# Patient Record
Sex: MALE | Race: WHITE | NOT HISPANIC OR LATINO
[De-identification: names, ages, dates, MRNs, and addresses within clinical notes are randomized per-mention and may not be internally consistent; named-entity substitution may affect disease eponyms.]

---

## 2017-08-28 PROBLEM — Z00.00 ENCOUNTER FOR PREVENTIVE HEALTH EXAMINATION: Status: ACTIVE | Noted: 2017-08-28

## 2017-10-25 ENCOUNTER — APPOINTMENT (OUTPATIENT)
Dept: UROLOGY | Facility: CLINIC | Age: 42
End: 2017-10-25

## 2020-01-27 ENCOUNTER — INPATIENT (INPATIENT)
Facility: HOSPITAL | Age: 45
LOS: 0 days | Discharge: HOME | End: 2020-01-28
Attending: INTERNAL MEDICINE | Admitting: INTERNAL MEDICINE
Payer: COMMERCIAL

## 2020-01-27 VITALS
OXYGEN SATURATION: 99 % | HEART RATE: 125 BPM | HEIGHT: 70 IN | TEMPERATURE: 98 F | RESPIRATION RATE: 20 BRPM | SYSTOLIC BLOOD PRESSURE: 177 MMHG | WEIGHT: 220.02 LBS | DIASTOLIC BLOOD PRESSURE: 97 MMHG

## 2020-01-27 DIAGNOSIS — Z90.89 ACQUIRED ABSENCE OF OTHER ORGANS: Chronic | ICD-10-CM

## 2020-01-27 LAB
ALBUMIN SERPL ELPH-MCNC: 4.8 G/DL — SIGNIFICANT CHANGE UP (ref 3.5–5.2)
ALP SERPL-CCNC: 49 U/L — SIGNIFICANT CHANGE UP (ref 30–115)
ALT FLD-CCNC: 40 U/L — SIGNIFICANT CHANGE UP (ref 0–41)
ANION GAP SERPL CALC-SCNC: 11 MMOL/L — SIGNIFICANT CHANGE UP (ref 7–14)
AST SERPL-CCNC: 31 U/L — SIGNIFICANT CHANGE UP (ref 0–41)
BASOPHILS # BLD AUTO: 0.03 K/UL — SIGNIFICANT CHANGE UP (ref 0–0.2)
BASOPHILS NFR BLD AUTO: 0.4 % — SIGNIFICANT CHANGE UP (ref 0–1)
BILIRUB SERPL-MCNC: 0.6 MG/DL — SIGNIFICANT CHANGE UP (ref 0.2–1.2)
BUN SERPL-MCNC: 13 MG/DL — SIGNIFICANT CHANGE UP (ref 10–20)
CALCIUM SERPL-MCNC: 9.7 MG/DL — SIGNIFICANT CHANGE UP (ref 8.5–10.1)
CHLORIDE SERPL-SCNC: 100 MMOL/L — SIGNIFICANT CHANGE UP (ref 98–110)
CK MB CFR SERPL CALC: 2.2 NG/ML — SIGNIFICANT CHANGE UP (ref 0.6–6.3)
CK SERPL-CCNC: 249 U/L — HIGH (ref 0–225)
CO2 SERPL-SCNC: 26 MMOL/L — SIGNIFICANT CHANGE UP (ref 17–32)
CREAT SERPL-MCNC: 1.3 MG/DL — SIGNIFICANT CHANGE UP (ref 0.7–1.5)
D DIMER BLD IA.RAPID-MCNC: 62 NG/ML DDU — SIGNIFICANT CHANGE UP (ref 0–230)
EOSINOPHIL # BLD AUTO: 0.11 K/UL — SIGNIFICANT CHANGE UP (ref 0–0.7)
EOSINOPHIL NFR BLD AUTO: 1.6 % — SIGNIFICANT CHANGE UP (ref 0–8)
GLUCOSE SERPL-MCNC: 94 MG/DL — SIGNIFICANT CHANGE UP (ref 70–99)
HCT VFR BLD CALC: 47.8 % — SIGNIFICANT CHANGE UP (ref 42–52)
HGB BLD-MCNC: 16.7 G/DL — SIGNIFICANT CHANGE UP (ref 14–18)
IMM GRANULOCYTES NFR BLD AUTO: 0.7 % — HIGH (ref 0.1–0.3)
LYMPHOCYTES # BLD AUTO: 2.15 K/UL — SIGNIFICANT CHANGE UP (ref 1.2–3.4)
LYMPHOCYTES # BLD AUTO: 30.5 % — SIGNIFICANT CHANGE UP (ref 20.5–51.1)
MCHC RBC-ENTMCNC: 29 PG — SIGNIFICANT CHANGE UP (ref 27–31)
MCHC RBC-ENTMCNC: 34.9 G/DL — SIGNIFICANT CHANGE UP (ref 32–37)
MCV RBC AUTO: 83 FL — SIGNIFICANT CHANGE UP (ref 80–94)
MONOCYTES # BLD AUTO: 0.57 K/UL — SIGNIFICANT CHANGE UP (ref 0.1–0.6)
MONOCYTES NFR BLD AUTO: 8.1 % — SIGNIFICANT CHANGE UP (ref 1.7–9.3)
NEUTROPHILS # BLD AUTO: 4.14 K/UL — SIGNIFICANT CHANGE UP (ref 1.4–6.5)
NEUTROPHILS NFR BLD AUTO: 58.7 % — SIGNIFICANT CHANGE UP (ref 42.2–75.2)
NRBC # BLD: 0 /100 WBCS — SIGNIFICANT CHANGE UP (ref 0–0)
PLATELET # BLD AUTO: 224 K/UL — SIGNIFICANT CHANGE UP (ref 130–400)
POTASSIUM SERPL-MCNC: 4.7 MMOL/L — SIGNIFICANT CHANGE UP (ref 3.5–5)
POTASSIUM SERPL-SCNC: 4.7 MMOL/L — SIGNIFICANT CHANGE UP (ref 3.5–5)
PROT SERPL-MCNC: 7.5 G/DL — SIGNIFICANT CHANGE UP (ref 6–8)
RBC # BLD: 5.76 M/UL — SIGNIFICANT CHANGE UP (ref 4.7–6.1)
RBC # FLD: 12.8 % — SIGNIFICANT CHANGE UP (ref 11.5–14.5)
SODIUM SERPL-SCNC: 137 MMOL/L — SIGNIFICANT CHANGE UP (ref 135–146)
TROPONIN T SERPL-MCNC: <0.01 NG/ML — SIGNIFICANT CHANGE UP
TROPONIN T SERPL-MCNC: <0.01 NG/ML — SIGNIFICANT CHANGE UP
WBC # BLD: 7.05 K/UL — SIGNIFICANT CHANGE UP (ref 4.8–10.8)
WBC # FLD AUTO: 7.05 K/UL — SIGNIFICANT CHANGE UP (ref 4.8–10.8)

## 2020-01-27 PROCEDURE — 99223 1ST HOSP IP/OBS HIGH 75: CPT

## 2020-01-27 PROCEDURE — 99291 CRITICAL CARE FIRST HOUR: CPT

## 2020-01-27 PROCEDURE — 71045 X-RAY EXAM CHEST 1 VIEW: CPT | Mod: 26

## 2020-01-27 RX ORDER — ENOXAPARIN SODIUM 100 MG/ML
100 INJECTION SUBCUTANEOUS ONCE
Refills: 0 | Status: COMPLETED | OUTPATIENT
Start: 2020-01-27 | End: 2020-01-27

## 2020-01-27 RX ORDER — DILTIAZEM HCL 120 MG
30 CAPSULE, EXT RELEASE 24 HR ORAL EVERY 6 HOURS
Refills: 0 | Status: DISCONTINUED | OUTPATIENT
Start: 2020-01-27 | End: 2020-01-28

## 2020-01-27 RX ORDER — ZOLPIDEM TARTRATE 10 MG/1
5 TABLET ORAL AT BEDTIME
Refills: 0 | Status: DISCONTINUED | OUTPATIENT
Start: 2020-01-27 | End: 2020-01-28

## 2020-01-27 RX ORDER — SODIUM CHLORIDE 9 MG/ML
1000 INJECTION INTRAMUSCULAR; INTRAVENOUS; SUBCUTANEOUS ONCE
Refills: 0 | Status: COMPLETED | OUTPATIENT
Start: 2020-01-27 | End: 2020-01-27

## 2020-01-27 RX ORDER — DILTIAZEM HCL 120 MG
10 CAPSULE, EXT RELEASE 24 HR ORAL ONCE
Refills: 0 | Status: COMPLETED | OUTPATIENT
Start: 2020-01-27 | End: 2020-01-27

## 2020-01-27 RX ORDER — ENOXAPARIN SODIUM 100 MG/ML
100 INJECTION SUBCUTANEOUS EVERY 12 HOURS
Refills: 0 | Status: DISCONTINUED | OUTPATIENT
Start: 2020-01-27 | End: 2020-01-28

## 2020-01-27 RX ORDER — LOSARTAN POTASSIUM 100 MG/1
0 TABLET, FILM COATED ORAL
Qty: 0 | Refills: 0 | DISCHARGE

## 2020-01-27 RX ORDER — DILTIAZEM HCL 120 MG
5 CAPSULE, EXT RELEASE 24 HR ORAL
Qty: 125 | Refills: 0 | Status: DISCONTINUED | OUTPATIENT
Start: 2020-01-27 | End: 2020-01-27

## 2020-01-27 RX ORDER — CHLORHEXIDINE GLUCONATE 213 G/1000ML
1 SOLUTION TOPICAL
Refills: 0 | Status: DISCONTINUED | OUTPATIENT
Start: 2020-01-27 | End: 2020-01-28

## 2020-01-27 RX ORDER — INFLUENZA VIRUS VACCINE 15; 15; 15; 15 UG/.5ML; UG/.5ML; UG/.5ML; UG/.5ML
0.5 SUSPENSION INTRAMUSCULAR ONCE
Refills: 0 | Status: DISCONTINUED | OUTPATIENT
Start: 2020-01-27 | End: 2020-01-28

## 2020-01-27 RX ADMIN — Medication 5 MG/HR: at 19:00

## 2020-01-27 RX ADMIN — ENOXAPARIN SODIUM 100 MILLIGRAM(S): 100 INJECTION SUBCUTANEOUS at 14:56

## 2020-01-27 RX ADMIN — Medication 10 MILLIGRAM(S): at 13:08

## 2020-01-27 RX ADMIN — SODIUM CHLORIDE 2000 MILLILITER(S): 9 INJECTION INTRAMUSCULAR; INTRAVENOUS; SUBCUTANEOUS at 13:09

## 2020-01-27 RX ADMIN — Medication 5 MG/HR: at 13:09

## 2020-01-27 RX ADMIN — Medication 30 MILLIGRAM(S): at 23:57

## 2020-01-27 RX ADMIN — ZOLPIDEM TARTRATE 5 MILLIGRAM(S): 10 TABLET ORAL at 23:57

## 2020-01-27 NOTE — H&P ADULT - HISTORY OF PRESENT ILLNESS
44 yr M with hx of HTN not taking medications regularly, hypogonadism ?treated with testosterone every 2 weekly came with c/o acute onset SOB and lethargy today at work and found to be in atrial fibrillation .  pt has a hx of HTN but don't takes meds regularly ,takes Losartan 50 PRN ,was having headaches and his bp was high for last 2-3 days which is  unusual for him .Took 100 mg Losartan today ,no c/o chest pain ,cough or any other symptoms .Was planning to get evaluated for possible sleep apnea .  Has a negative stress test with Dr Parra a yr ago.    In ER ,EKG a fib , negative CE   received Cardizem drip and Lovenox for A/c   admitted to CCU for rate control and work up and cardio eval

## 2020-01-27 NOTE — ED PROVIDER NOTE - CLINICAL SUMMARY MEDICAL DECISION MAKING FREE TEXT BOX
Patient will be admitted to a monitored setting. In my opinion, in patient treatment is medically justifiable and appropriate.

## 2020-01-27 NOTE — ED PROVIDER NOTE - OBJECTIVE STATEMENT
c/p mild substernal chest pain w/o radiation x 1 day a/w HTN and anxiety.  no palpitations.  denies fever, trauma.    PMH: HTN  previous negative stress tests

## 2020-01-27 NOTE — ED PROVIDER NOTE - PHYSICAL EXAMINATION
VITAL SIGNS: I have reviewed nursing notes and confirm.  CONSTITUTIONAL: Well-developed; well-nourished; in no acute distress.  SKIN: Skin exam is warm and dry, no acute rash.  HEAD: Normocephalic; atraumatic.  EYES: PERRL, EOM intact; conjunctiva and sclera clear.  ENT: No nasal discharge; airway clear. Throat clear.  NECK: Supple; non tender.  No lymphadenopathy.  no JVD  CARD: no murmurs, gallops, or rubs. Irregular rate and rhythm.  RESP: No wheezes, rales or rhonchi.  ABD: Normal bowel sounds; soft; non-distended; non-tender; no hepatosplenomegaly.  EXT: Normal ROM. No clubbing, cyanosis or edema.  NEURO: Alert, oriented. Grossly unremarkable. No focal deficits.  PSYCH: Cooperative, appropriate.

## 2020-01-27 NOTE — H&P ADULT - NSHPOUTPATIENTPROVIDERS_GEN_ALL_CORE
cardio ; Dr Penn
Number Of Photographs Reviewed: 11
Detail Level: Detailed
Review Findings: no new or changing lesions

## 2020-01-27 NOTE — H&P ADULT - NSHPPHYSICALEXAM_GEN_ALL_CORE
Vital Signs Last 24 Hrs  T(C): 36.9 (27 Jan 2020 15:52), Max: 36.9 (27 Jan 2020 15:52)  T(F): 98.4 (27 Jan 2020 15:52), Max: 98.4 (27 Jan 2020 15:52)  HR: 94 (27 Jan 2020 15:52) (92 - 125)  BP: 151/98 (27 Jan 2020 15:52) (130/99 - 177/97)  BP(mean): --  RR: 18 (27 Jan 2020 15:52) (18 - 20)  SpO2: 99% (27 Jan 2020 15:52) (99% - 99%)  PHYSICAL EXAM:      Constitutional: no acute distress    Eyes: no pallor or icterus    ENMT: within normal range    Neck: no JVD    Back:  no CVA tenderness    Respiratory: VB +0    Cardiovascular: S1 +S2+0    Gastrointestinal: soft ,non tender ,BS +     Extremities:  no LE edema     Vascular: + palpable pulses    Neurological: AAO * 3   non focal

## 2020-01-27 NOTE — ED PROVIDER NOTE - NS ED ROS FT
Constitutional: No fever, chills, malaise.  Eyes: No visual changes, eye pain or discharge. No Photophobia  ENMT: No hearing changes, pain, discharge or infections. No neck pain or stiffness.   GI: No nausea, vomiting, diarrhea or abdominal pain.  : No dysuria, frequency or burning. No Discharge  MS: No myalgia, muscle weakness, joint pain or back pain.  Neuro: No headache or weakness. No LOC.  Skin: No skin rash.  Except as documented in the HPI, all other systems are negative.

## 2020-01-27 NOTE — H&P ADULT - NSHPLABSRESULTS_GEN_ALL_CORE
16.7   7.05  )-----------( 224      ( 27 Jan 2020 13:09 )             47.8   01-27    137  |  100  |  13  ----------------------------<  94  4.7   |  26  |  1.3    Ca    9.7      27 Jan 2020 13:09    TPro  7.5  /  Alb  4.8  /  TBili  0.6  /  DBili  x   /  AST  31  /  ALT  40  /  AlkPhos  49  01-27  CARDIAC MARKERS ( 27 Jan 2020 13:09 )  x     / <0.01 ng/mL / x     / x     / x        < from: 12 Lead ECG (01.27.20 @ 12:29) >    Diagnosis Line Atrial fibrillation with rapid ventricular response  Incompleteright bundle branch block  Abnormal ECG    < end of copied text >    < from: Xray Chest 1 View-PORTABLE IMMEDIATE (01.27.20 @ 13:04) >      Impression:      No radiographic evidence of acute cardiopulmonary disease.    < end of copied text >

## 2020-01-27 NOTE — H&P ADULT - ASSESSMENT
New onset A fib   HTN   possible sleep apnea   Hypogonadism  s/p  testosterone therapy ?    # NEW ONSET A FIB ;  cw cardizem drip   CHADvasc score 1   will anti coagulate with Lovenox for now   2 D echo   serial cardiac enzymes   cardio eval   TSH ,free T4   lipid profile and Hg A1c for risk stratification   out pt sleep studies    # HTN ;  cw Cardizem for now     # DVT ppx ; with lovenox  DASH diet   FULL code     # dispo; CCU

## 2020-01-28 ENCOUNTER — TRANSCRIPTION ENCOUNTER (OUTPATIENT)
Age: 45
End: 2020-01-28

## 2020-01-28 VITALS
OXYGEN SATURATION: 97 % | RESPIRATION RATE: 18 BRPM | HEART RATE: 81 BPM | SYSTOLIC BLOOD PRESSURE: 142 MMHG | TEMPERATURE: 98 F | DIASTOLIC BLOOD PRESSURE: 86 MMHG

## 2020-01-28 LAB
ALBUMIN SERPL ELPH-MCNC: 4.7 G/DL — SIGNIFICANT CHANGE UP (ref 3.5–5.2)
ALP SERPL-CCNC: 49 U/L — SIGNIFICANT CHANGE UP (ref 30–115)
ALT FLD-CCNC: 37 U/L — SIGNIFICANT CHANGE UP (ref 0–41)
ANION GAP SERPL CALC-SCNC: 12 MMOL/L — SIGNIFICANT CHANGE UP (ref 7–14)
AST SERPL-CCNC: 25 U/L — SIGNIFICANT CHANGE UP (ref 0–41)
BASOPHILS # BLD AUTO: 0.04 K/UL — SIGNIFICANT CHANGE UP (ref 0–0.2)
BASOPHILS NFR BLD AUTO: 0.4 % — SIGNIFICANT CHANGE UP (ref 0–1)
BILIRUB SERPL-MCNC: 0.8 MG/DL — SIGNIFICANT CHANGE UP (ref 0.2–1.2)
BUN SERPL-MCNC: 14 MG/DL — SIGNIFICANT CHANGE UP (ref 10–20)
CALCIUM SERPL-MCNC: 9.3 MG/DL — SIGNIFICANT CHANGE UP (ref 8.5–10.1)
CHLORIDE SERPL-SCNC: 104 MMOL/L — SIGNIFICANT CHANGE UP (ref 98–110)
CHOLEST SERPL-MCNC: 237 MG/DL — HIGH (ref 100–200)
CK SERPL-CCNC: 206 U/L — SIGNIFICANT CHANGE UP (ref 0–225)
CO2 SERPL-SCNC: 25 MMOL/L — SIGNIFICANT CHANGE UP (ref 17–32)
CREAT SERPL-MCNC: 1.3 MG/DL — SIGNIFICANT CHANGE UP (ref 0.7–1.5)
EOSINOPHIL # BLD AUTO: 0.12 K/UL — SIGNIFICANT CHANGE UP (ref 0–0.7)
EOSINOPHIL NFR BLD AUTO: 1.3 % — SIGNIFICANT CHANGE UP (ref 0–8)
ESTIMATED AVERAGE GLUCOSE: 97 MG/DL — SIGNIFICANT CHANGE UP (ref 68–114)
GLUCOSE SERPL-MCNC: 86 MG/DL — SIGNIFICANT CHANGE UP (ref 70–99)
HBA1C BLD-MCNC: 5 % — SIGNIFICANT CHANGE UP (ref 4–5.6)
HCT VFR BLD CALC: 49.4 % — SIGNIFICANT CHANGE UP (ref 42–52)
HDLC SERPL-MCNC: 29 MG/DL — LOW
HGB BLD-MCNC: 16.7 G/DL — SIGNIFICANT CHANGE UP (ref 14–18)
IMM GRANULOCYTES NFR BLD AUTO: 0.7 % — HIGH (ref 0.1–0.3)
LIPID PNL WITH DIRECT LDL SERPL: 161 MG/DL — HIGH (ref 4–129)
LYMPHOCYTES # BLD AUTO: 3.12 K/UL — SIGNIFICANT CHANGE UP (ref 1.2–3.4)
LYMPHOCYTES # BLD AUTO: 34.5 % — SIGNIFICANT CHANGE UP (ref 20.5–51.1)
MCHC RBC-ENTMCNC: 28.7 PG — SIGNIFICANT CHANGE UP (ref 27–31)
MCHC RBC-ENTMCNC: 33.8 G/DL — SIGNIFICANT CHANGE UP (ref 32–37)
MCV RBC AUTO: 84.9 FL — SIGNIFICANT CHANGE UP (ref 80–94)
MONOCYTES # BLD AUTO: 0.66 K/UL — HIGH (ref 0.1–0.6)
MONOCYTES NFR BLD AUTO: 7.3 % — SIGNIFICANT CHANGE UP (ref 1.7–9.3)
NEUTROPHILS # BLD AUTO: 5.04 K/UL — SIGNIFICANT CHANGE UP (ref 1.4–6.5)
NEUTROPHILS NFR BLD AUTO: 55.8 % — SIGNIFICANT CHANGE UP (ref 42.2–75.2)
NRBC # BLD: 0 /100 WBCS — SIGNIFICANT CHANGE UP (ref 0–0)
PLATELET # BLD AUTO: 222 K/UL — SIGNIFICANT CHANGE UP (ref 130–400)
POTASSIUM SERPL-MCNC: 4.6 MMOL/L — SIGNIFICANT CHANGE UP (ref 3.5–5)
POTASSIUM SERPL-SCNC: 4.6 MMOL/L — SIGNIFICANT CHANGE UP (ref 3.5–5)
PROT SERPL-MCNC: 7.2 G/DL — SIGNIFICANT CHANGE UP (ref 6–8)
RBC # BLD: 5.82 M/UL — SIGNIFICANT CHANGE UP (ref 4.7–6.1)
RBC # FLD: 13 % — SIGNIFICANT CHANGE UP (ref 11.5–14.5)
SODIUM SERPL-SCNC: 141 MMOL/L — SIGNIFICANT CHANGE UP (ref 135–146)
T4 FREE SERPL-MCNC: 1.2 NG/DL — SIGNIFICANT CHANGE UP (ref 0.9–1.8)
TOTAL CHOLESTEROL/HDL RATIO MEASUREMENT: 8.2 RATIO — HIGH (ref 4–5.5)
TRIGL SERPL-MCNC: 302 MG/DL — HIGH (ref 10–149)
TROPONIN T SERPL-MCNC: <0.01 NG/ML — SIGNIFICANT CHANGE UP
TSH SERPL-MCNC: 2.16 UIU/ML — SIGNIFICANT CHANGE UP (ref 0.27–4.2)
WBC # BLD: 9.04 K/UL — SIGNIFICANT CHANGE UP (ref 4.8–10.8)
WBC # FLD AUTO: 9.04 K/UL — SIGNIFICANT CHANGE UP (ref 4.8–10.8)

## 2020-01-28 PROCEDURE — 71045 X-RAY EXAM CHEST 1 VIEW: CPT | Mod: 26

## 2020-01-28 PROCEDURE — 99239 HOSP IP/OBS DSCHRG MGMT >30: CPT

## 2020-01-28 RX ORDER — LOSARTAN POTASSIUM 100 MG/1
1 TABLET, FILM COATED ORAL
Qty: 0 | Refills: 0 | DISCHARGE

## 2020-01-28 RX ORDER — ATORVASTATIN CALCIUM 80 MG/1
40 TABLET, FILM COATED ORAL AT BEDTIME
Refills: 0 | Status: DISCONTINUED | OUTPATIENT
Start: 2020-01-28 | End: 2020-01-28

## 2020-01-28 RX ORDER — METOPROLOL TARTRATE 50 MG
1 TABLET ORAL
Qty: 60 | Refills: 0
Start: 2020-01-28 | End: 2020-02-26

## 2020-01-28 RX ORDER — ATORVASTATIN CALCIUM 80 MG/1
1 TABLET, FILM COATED ORAL
Qty: 30 | Refills: 0
Start: 2020-01-28 | End: 2020-02-26

## 2020-01-28 RX ORDER — RIVAROXABAN 15 MG-20MG
20 KIT ORAL
Refills: 0 | Status: DISCONTINUED | OUTPATIENT
Start: 2020-01-28 | End: 2020-01-28

## 2020-01-28 RX ORDER — METOPROLOL TARTRATE 50 MG
25 TABLET ORAL
Refills: 0 | Status: DISCONTINUED | OUTPATIENT
Start: 2020-01-28 | End: 2020-01-28

## 2020-01-28 RX ORDER — RIVAROXABAN 15 MG-20MG
1 KIT ORAL
Qty: 30 | Refills: 0
Start: 2020-01-28 | End: 2020-02-26

## 2020-01-28 RX ADMIN — Medication 30 MILLIGRAM(S): at 06:34

## 2020-01-28 RX ADMIN — ENOXAPARIN SODIUM 100 MILLIGRAM(S): 100 INJECTION SUBCUTANEOUS at 03:00

## 2020-01-28 RX ADMIN — RIVAROXABAN 20 MILLIGRAM(S): KIT at 15:15

## 2020-01-28 NOTE — DISCHARGE NOTE PROVIDER - HOSPITAL COURSE
44 yr M with hx of HTN not taking medications regularly, hypogonadism ?treated with testosterone every 2 weekly came with c/o acute onset SOB and lethargy today at work and found to be in atrial fibrillation .    pt has a hx of HTN but don't takes meds regularly ,takes Losartan 50 PRN ,was having headaches and his bp was high for last 2-3 days which is  unusual for him .Took 100 mg Losartan today ,no c/o chest pain ,cough or any other symptoms .Was planning to get evaluated for possible sleep apnea .    Has a negative stress test with Dr Parra a yr ago.        In ER ,EKG a fib , negative CE     received Cardizem drip and Lovenox for A/c     admitted to CCU for rate control and work up and cardio eval (27 Jan 2020 16:24)        # For NEW ONSET A FIB ;pt was given cardizem drip and than switched to on oral metoprolol 50 bid     will d/c pt on xaralto 20 qd    TSH ,free T4 within normal range    lipid profile abn ,started on Lipitor 40    out pt sleep studies with Dr Julian and out pt follow up with Dr Penn for A fib     pt converted to sinus today

## 2020-01-28 NOTE — DISCHARGE NOTE PROVIDER - NSDCMRMEDTOKEN_GEN_ALL_CORE_FT
atorvastatin 40 mg oral tablet: 1 tab(s) orally once a day (at bedtime)  metoprolol tartrate 25 mg oral tablet: 1 tab(s) orally 2 times a day  rivaroxaban 20 mg oral tablet: 1 tab(s) orally once a day (before a meal)

## 2020-01-28 NOTE — DISCHARGE NOTE PROVIDER - CARE PROVIDERS DIRECT ADDRESSES
,DirectAddress_Unknown,[email or web address],DirectAddress_Unknown

## 2020-01-28 NOTE — PROGRESS NOTE ADULT - ASSESSMENT
Patient with afib. Not clear how long. He has untreated HTN. He has probable joon.  Beta Xarelto 20 echo. Outpatient sleep study . If remain in afib after use c-pap cardiovert as outpatient

## 2020-01-28 NOTE — DISCHARGE NOTE PROVIDER - NSDCCPCAREPLAN_GEN_ALL_CORE_FT
PRINCIPAL DISCHARGE DIAGNOSIS  Diagnosis: Atrial fibrillation with rapid ventricular response  Assessment and Plan of Treatment:       SECONDARY DISCHARGE DIAGNOSES  Diagnosis: FANG (obstructive sleep apnea)  Assessment and Plan of Treatment: suspected

## 2020-01-28 NOTE — DISCHARGE NOTE NURSING/CASE MANAGEMENT/SOCIAL WORK - PATIENT PORTAL LINK FT
You can access the FollowMyHealth Patient Portal offered by [facility] by registering at the following website: [email or web address]. By joining [employer]’s FollowMyHealth portal, you will also be able to view your health information using other applications (apps) compatible with our system.

## 2020-01-28 NOTE — DISCHARGE NOTE PROVIDER - CARE PROVIDER_API CALL
Min Penn)  Cardiovascular Disease; Interventional Cardiology  [address]  [place] [address]  Phone: [phone]  Fax: [phone]  Follow Up Time:     Femi Julian)  Critical Care Medicine; Geriatric Medicine; Internal Medicine; Pulmonary Disease  [address] [place] [address], [address]  [place] [address]  Phone: [phone]  Fax: [phone]  Follow Up Time:     Piotr Natarajan)  Med  [facility]ist[facility]  [address] [place] [address]  Phone: [phone]  Fax: [phone]  Follow Up Time:

## 2020-01-28 NOTE — DISCHARGE NOTE PROVIDER - PROVIDER TOKENS
PROVIDER:[TOKEN:[[id number]]],PROVIDER:[TOKEN:[[id number]]],PROVIDER:[TOKEN:[[id number]]]

## 2020-01-28 NOTE — PROGRESS NOTE ADULT - SUBJECTIVE AND OBJECTIVE BOX
CARDIOLOGY CONSULT NOTE     CHIEF COMPLAINT/REASON FOR CONSULT:    HPI:  44 yr M with hx of HTN not taking medications regularly, hypogonadism ?treated with testosterone every 2 weekly came with c/o acute onset SOB and lethargy today at work and found to be in atrial fibrillation .  pt has a hx of HTN but doed not  take meds regularly ,takes Losartan 50 PRN ,was having headaches and his bp was high for last 2-3 days which is  unusual for him .Took 100 mg Losartan yesterday ,no c/o chest pain ,cough or any other symptoms .Was planning to get evaluated for possible sleep apnea .  Has a negative stress test with Dr Parra a yr ago.    In ER ,EKG a fib , negative CE       PAST MEDICAL & SURGICAL HISTORY:  HTN (hypertension)  S/P tonsillectomy      Cardiac Risks:   [x ]HTN, [ ] DM, [ ] Smoking, [ ] FH,  [ ] Lipids        MEDICATIONS:  MEDICATIONS  (STANDING):  atorvastatin 40 milliGRAM(s) Oral at bedtime  chlorhexidine 4% Liquid 1 Application(s) Topical <User Schedule>  enoxaparin Injectable 100 milliGRAM(s) SubCutaneous every 12 hours  influenza   Vaccine 0.5 milliLiter(s) IntraMuscular once  metoprolol tartrate 25 milliGRAM(s) Oral two times a day      FAMILY HISTORY:  FH: atrial fibrillation: father      SOCIAL HISTORY:      [ ] Marital status   Allergies    No Known Allergies      	    REVIEW OF SYSTEMS:  CONSTITUTIONAL: No fever, weight loss, or fatigue  EYES: No eye pain, visual disturbances, or discharge  ENMT:  No difficulty hearing, tinnitus, vertigo; No sinus or throat pain  NECK: No pain or stiffness  RESPIRATORY: No cough, wheezing, chills or hemoptysis; No Shortness of Breath  CARDIOVASCULAR: See above  GASTROINTESTINAL: No abdominal or epigastric pain. No nausea, vomiting, or hematemesis; No diarrhea or constipation. No melena or hematochezia.  GENITOURINARY: No dysuria, frequency, hematuria, or incontinence  NEUROLOGICAL: No headaches, memory loss, loss of strength, numbness, or tremors  SKIN: No itching, burning, rashes, or lesions   	    PHYSICAL EXAM:  T(C): 36 (01-28-20 @ 07:13), Max: 36.9 (01-27-20 @ 15:52)  HR: 66 (01-28-20 @ 07:13) (66 - 125)  BP: 116/72 (01-28-20 @ 07:13) (116/72 - 177/97)  RR: 20 (01-28-20 @ 07:13) (18 - 20)  SpO2: 97% (01-28-20 @ 07:13) (97% - 99%)  Wt(kg): --  I&O's Summary    27 Jan 2020 07:01  -  28 Jan 2020 07:00  --------------------------------------------------------  IN: 40 mL / OUT: 1350 mL / NET: -1310 mL        Appearance: Normal	  Psychiatry: A & O x 3, Mood & affect appropriate  HEENT:   Normal oral mucosa, PERRL, EOMI	  Lymphatic: No lymphadenopathy  Cardiovascular: Normal S1 S2,irreg, No JVD, No murmurs  Respiratory: Lungs clear to auscultation	  Gastrointestinal:  Soft, Non-tender, + BS	  Skin: No rashes, No ecchymoses, No cyanosis	  Neurologic: Non-focal  Extremities: Normal range of motion, No clubbing, cyanosis or edema  Vascular: Peripheral pulses palpable 2+ bilaterally      ECG:  	< from: 12 Lead ECG (01.27.20 @ 12:29) >    Diagnosis Line Atrial fibrillation with rapid ventricular response  Incompleteright bundle branch block  Abnormal ECG    Confirmed by CHRYSTAL BASURTO MD ([id number]) on 1/27/2020 1:41:43 PM    < end of copied text >      	  LABS:	 	    CARDIAC MARKERS:                                    16.7   9.04  )-----------( 222      ( 28 Jan 2020 05:55 )             49.4     01-28    141  |  104  |  14  ----------------------------<  86  4.6   |  25  |  1.3    Ca    9.3      28 Jan 2020 05:55    TPro  7.2  /  Alb  4.7  /  TBili  0.8  /  DBili  <0.2  /  AST  25  /  ALT  37  /  AlkPhos  49  01-28

## 2020-01-28 NOTE — DISCHARGE NOTE PROVIDER - NSDCCPTREATMENT_GEN_ALL_CORE_FT
PRINCIPAL PROCEDURE  Procedure: 2D echo  Findings and Treatment: normal EF with mild TR,no atrial thrombus

## 2020-01-28 NOTE — PROGRESS NOTE ADULT - ATTENDING COMMENTS
Patient seen and evaluated independently medical resident note reviewed, I agree with plan and management, except as I have noted. echo is normal may dc home today on Xarelto, Lopressor, Lipitor. 33min spent on dc. needs outpt sleep study, pt aware

## 2020-01-28 NOTE — PROGRESS NOTE ADULT - ASSESSMENT
Assessment and Plan:   Assessment:  · Assessment		  New onset A fib   HTN   possible sleep apnea   Hypogonadism  s/p  testosterone therapy ?    # NEW ONSET A FIB ;  d/c Cardizem drip  started on oral metoprolol 50 bid   CHADvasc score 1   cw l anti coagulate with Lovenox for now   follow 2 D echo (if no valvular disease than will d/c pt on Xarelto )  serial cardiac enzymes negative so far  cardio eval pending  TSH ,free T4 within normal range  lipid profile abn ,started on Lipitor 40  out pt sleep studies    # HTN ;  cw Lopressor     # DVT ppx ; with lovenox  DASH diet   FULL code     # dispo; d/c planning   pending echo read and cardio eval   will need [facility] appointment and pricing to be checked if sending pt with Xarelto

## 2020-01-28 NOTE — PROGRESS NOTE ADULT - SUBJECTIVE AND OBJECTIVE BOX
LENGTH OF HOSPITAL STAY: 1d    CHIEF COMPLAINT:   Patient is a 44y old  Male who presents with a chief complaint of sob and lethargy /new onset a fib (27 Jan 2020 16:24)    EVENTS OVERNIGHT ; was bradycardic overnight   Cardizem drip d/brian   asymptomatic   pending cardio eval and echo results       HISTORY OF PRESENTING ILLNESS:    HPI:  44 yr M with hx of HTN not taking medications regularly, hypogonadism ?treated with testosterone every 2 weekly came with c/o acute onset SOB and lethargy today at work and found to be in atrial fibrillation .  pt has a hx of HTN but don't takes meds regularly ,takes Losartan 50 PRN ,was having headaches and his bp was high for last 2-3 days which is  unusual for him .Took 100 mg Losartan today ,no c/o chest pain ,cough or any other symptoms .Was planning to get evaluated for possible sleep apnea .  Has a negative stress test with Dr Parra a yr ago.    In ER ,EKG a fib , negative CE   received Cardizem drip and Lovenox for A/c   admitted to CCU for rate control and work up and cardio eval (27 Jan 2020 16:24)    PAST MEDICAL & SURGICAL HISTORY  PAST MEDICAL & SURGICAL HISTORY:  HTN (hypertension)  S/P tonsillectomy    SOCIAL HISTORY:    ALLERGIES:  No Known Allergies    MEDICATIONS:  STANDING MEDICATIONS  atorvastatin 40 milliGRAM(s) Oral at bedtime  chlorhexidine 4% Liquid 1 Application(s) Topical <User Schedule>  enoxaparin Injectable 100 milliGRAM(s) SubCutaneous every 12 hours  influenza   Vaccine 0.5 milliLiter(s) IntraMuscular once  metoprolol tartrate 25 milliGRAM(s) Oral two times a day    PRN MEDICATIONS  zolpidem 5 milliGRAM(s) Oral at bedtime PRN    VITALS:   T(F): 96.8  HR: 66  BP: 116/72  RR: 20  SpO2: 97%    LABS:                        16.7   9.04  )-----------( 222      ( 28 Jan 2020 05:55 )             49.4     01-28    141  |  104  |  14  ----------------------------<  86  4.6   |  25  |  1.3    Ca    9.3      28 Jan 2020 05:55    TPro  7.2  /  Alb  4.7  /  TBili  0.8  /  DBili  <0.2  /  AST  25  /  ALT  37  /  AlkPhos  49  01-28          Creatine Kinase, Serum: 206 U/L (01-28-20 @ 05:55)  Troponin T, Serum: <0.01 ng/mL (01-28-20 @ 05:55)  Creatine Kinase, Serum: 249 U/L <H> (01-27-20 @ 19:46)  Troponin T, Serum: <0.01 ng/mL (01-27-20 @ 19:46)  Troponin T, Serum: <0.01 ng/mL (01-27-20 @ 13:09)      CARDIAC MARKERS ( 28 Jan 2020 05:55 )  x     / <0.01 ng/mL / 206 U/L / x     / x      CARDIAC MARKERS ( 27 Jan 2020 19:46 )  x     / <0.01 ng/mL / 249 U/L / x     / 2.2 ng/mL  CARDIAC MARKERS ( 27 Jan 2020 13:09 )  x     / <0.01 ng/mL / x     / x     / x          RADIOLOGY:  < from: Xray Chest 1 View- PORTABLE-Routine (01.28.20 @ 06:28) >  Impression:      No radiographic evidence of acute cardiopulmonary disease.        < end of copied text >    PHYSICAL EXAM:  GEN: No acute distress  HEENT: within normal range   LUNGS: Clear to auscultation bilaterally   HEART: S1/S2 present. RRR.   ABD: Soft, non-tender, non-distended. Bowel sounds present  EXT:no LE edema  NEURO: AAOX3  non focal

## 2020-02-03 DIAGNOSIS — I10 ESSENTIAL (PRIMARY) HYPERTENSION: ICD-10-CM

## 2020-02-03 DIAGNOSIS — E23.0 HYPOPITUITARISM: ICD-10-CM

## 2020-02-03 DIAGNOSIS — G47.33 OBSTRUCTIVE SLEEP APNEA (ADULT) (PEDIATRIC): ICD-10-CM

## 2020-02-03 DIAGNOSIS — Z79.899 OTHER LONG TERM (CURRENT) DRUG THERAPY: ICD-10-CM

## 2020-02-03 DIAGNOSIS — R00.1 BRADYCARDIA, UNSPECIFIED: ICD-10-CM

## 2020-02-03 DIAGNOSIS — I48.91 UNSPECIFIED ATRIAL FIBRILLATION: ICD-10-CM

## 2020-02-03 DIAGNOSIS — Z91.14 PATIENT'S OTHER NONCOMPLIANCE WITH MEDICATION REGIMEN: ICD-10-CM

## 2022-03-14 NOTE — ED ADULT TRIAGE NOTE - WEIGHT METHOD
[facility] MD Nicky Morales MD Thurlow Lederer, MD Ty Dunk, MD Libby Collier, MOE Groves, OBED  Progress Note    3/13/2022    SUBJECTIVE:   3/4: Off the floor. Discussed case with bedside RN, Dr. Rupert Dunbar. Unable to perform diagnostic radiographic studies due to agitation, movement    3/5 transfer to the ICU intubated via ETT, ventilated. Hemodynamically stable. Sedated appears comfortable on vent. IV K-Phos infusion ordered this morning, still not delivered to nursing unit for administration. IV fluid stopped yesterday for unknown period of time, held today for 2 to 3 hours, reasons not clear though apparently IV access was not lost.    3/6: Remains critically ill. Intubated via ETT. Vent setting stable. Minimally responsive without sedation. Urine output poor over the course of today. BP rather low, though hemodynamically stable. 3/7 : seen in his room , intubated , mechanically ventilated , BP stable . UO on low side . Vent setting stable     3/8 seen earlier today , intubated fio2 30  Low UO . No pressors   D/w Dr Kirk Leonardo at bedside   3/9:  Remains critically ill. Intubated on vent,  Fio2- 30% peep-8. Remains off pressors. Urinoe output continue to be low. Minimally responsive. 3/10 : seen in his room , intubated , mechaincally ventilated fio2 30 . No pressors comfortable 2+ edema in LE     3/11 : seen in his room intubated mechanically ventiated no pressors fio2 30 peep 8 good UO in response to lasix /spa . Still with 2-3+ LE edema b/l      3/12 : seen in his room , intubated mechanically ventilated . Good UO , fio2 30 peep 8   Awake . Still very weak   D/w Dr Kirk Leonardo at bedside      3/13 : excellent UO on lasix/spa . CXR improving . Extubated today   On o2/nc     PROBLEM LIST:    Principal Problem:    Altered mental status  Resolved Problems:    * No resolved hospital problems.  *         DIET:    Diet NPO  ADULT TUBE FEEDING; Orogastric; Immune Enhancing; Continuous; 10; Yes; 10; Q 4 hours; 50; 30;  Q 6 hours     MEDS (scheduled):    thiamine (VITAMIN B1) IVPB  250 mg IntraVENous Q24H    furosemide  40 mg IntraVENous Q8H    fluconazole  200 mg IntraVENous Q24H    [Held by provider] senna  1 tablet Oral Nightly    docusate  100 mg Per NG tube BID    enoxaparin  40 mg SubCUTAneous Daily    chlorhexidine  15 mL Mouth/Throat BID    pantoprazole  40 mg IntraVENous Daily    ipratropium-albuterol  1 ampule Inhalation Q4H WA    nystatin  5 mL Oral 4x Daily    [Held by provider] amLODIPine  5 mg Oral Daily    [Held by provider] folic acid  1 mg Oral Daily    sucralfate  1 g Oral 4x Daily AC & HS    [Held by provider] vitamin C  500 mg Oral TID    [Held by provider] Vitamin D  1,000 Units Oral Daily    [Held by provider] zinc sulfate  50 mg Oral Daily    white petrolatum   Topical BID       MEDS (infusions):   sodium chloride         MEDS (prn):  sodium chloride, fentanNYL, gadoteridol, ondansetron, acetaminophen, cetirizine    PHYSICAL EXAM:     Patient Vitals for the past 24 hrs:   Temp Temp src Pulse Resp SpO2   03/13/22 2100 -- -- 105 15 98 %   03/13/22 2000 99.1 °F (37.3 °C) Axillary 105 30 98 %   03/13/22 1959 -- -- -- 20 97 %   03/13/22 1900 -- -- 111 19 98 %   03/13/22 1800 -- -- 107 16 98 %   03/13/22 1700 -- -- 109 17 91 %   03/13/22 1600 -- -- 107 18 98 %   03/13/22 1543 98.9 °F (37.2 °C) Oral -- -- --   03/13/22 1500 -- -- 107 15 99 %   03/13/22 1400 -- -- 110 (!) 31 98 %   03/13/22 1300 -- -- 111 15 100 %   03/13/22 1200 99.3 °F (37.4 °C) Axillary 115 26 98 %   03/13/22 1125 -- -- 116 18 97 %   03/13/22 1110 -- -- 104 13 95 %   03/13/22 1100 -- -- 109 12 95 %   03/13/22 1000 -- -- 114 12 96 %   03/13/22 0900 -- -- 121 10 97 %   03/13/22 0840 -- -- 108 10 96 %   03/13/22 0800 100 °F (37.8 °C) Esophageal 108 10 96 %   03/13/22 0530 -- -- 111 15 97 %   03/13/22 0430 -- -- 112 15 --   03/13/22 0400 98.6 °F (37 °C) Axillary -- -- --   03/13/22 0354 -- -- 111 14 99 %   03/13/22 0330 -- -- 111 24 99 %   03/13/22 0130 -- -- 115 16 99 %   03/13/22 0030 -- -- 114 14 100 %   03/13/22 0000 98.3 °F (36.8 °C) Oral -- -- --   03/12/22 2346 -- -- 111 10 98 %   03/12/22 2330 -- -- 110 10 97 %   03/12/22 2230 -- -- 113 16 94 %   03/12/22 2130 -- -- 111 10 96 %   @      Intake/Output Summary (Last 24 hours) at 3/13/2022 2128  Last data filed at 3/13/2022 2000  Gross per 24 hour   Intake 706.72 ml   Output 4110 ml   Net -3403.28 ml         Wt Readings from Last 3 Encounters:   03/10/22 211 lb 6.4 oz (95.9 kg)   02/23/22 186 lb 12.8 oz (84.7 kg)   11/27/18 215 lb (97.5 kg)       Constitutional:  Intubated on vent  HEENT: NC/AT, EOMI, sclera and conjunctiva are clear and anicteric, mucus membranes moist  Neck: Trachea midline, no JVD  Cardiovascular: S1, S2 regular rhythm, no murmur,or rub  Respiratory: Coarse breath sounds throughout all lung fields no crackles, no wheeze  Gastrointestinal:  Soft, nontender, nondistended, NABS  Ext: no edema, feet warm  Skin: dry, no rash  Neuro:  Minimally responsive      DATA:    Recent Labs     03/11/22  0526 03/11/22  1605 03/12/22  0350 03/12/22  1615 03/13/22  0349   WBC 8.8  --  9.7  --  9.9   HGB 6.3*   < > 7.0* 7.7* 7.5*   HCT 19.2*   < > 20.8* 22.9* 21.9*   .7*  --  107.8*  --  101.9*   *  --  126*  --  108*    < > = values in this interval not displayed.      Recent Labs     03/11/22  0526 03/11/22  0526 03/11/22  1605 03/11/22  1605 03/11/22 2000 03/12/22  0350 03/13/22  0349      < > 139  --   --  139 139   K 3.4*   < > 3.8   < > 3.8 3.6 3.2*   CL 98   < > 99  --   --  98 95*   CO2 25   < > 25  --   --  25 28   MG 1.6  --   --   --   --  1.9 1.7   PHOS 3.1  --   --   --   --  2.9 2.9   BUN 28*   < > 26*  --   --  26* 25*   CREATININE 1.6*   < > 1.5*  --   --  1.6* 1.6*   ALT  --   --   --   --   --  15 15   AST  --   --   --   --   --  49* 43*   BILITOT  --   --   --   --   -- 1. 1 1.3*   ALKPHOS  --   --   --   --   --  85 79    < > = values in this interval not displayed. No results found for: LABPROT    ASSESSMENT    75 y.o. gentleman known to service, followed for FRANCISCA in January of this year. Admitted with acute mental status change, myoclonic jerks and unusual movements.     1. Chronic kidney disease, stage IIIa. Creatinine at baseline, 1.4 to 1.6 mg/dL     2.  Metabolic acidosis, wide anion gap. Resolved with bicarbonate drip    3. Cannabis on drug screen    4. Hypophosphatemia. 5. Hypernatremia, dehydration due to increased insensible losses, poor intake/input. Resolved    6.  Hypokalemia, normalized with supplement      Plan :    Cr stable/better   excellent UO with negative balance  Extubated today    Continue lasix 40 iv q8hr along with SPA for another day  F/u UO , BMP       Electronically signed by Emi Ha MD on 3/13/2022 stated

## 2022-06-01 NOTE — ED ADULT TRIAGE NOTE - NS ED NURSE BANDS TYPE
Name band;
Bed in lowest position, wheels locked, appropriate side rails in place/Call bell, personal items and telephone in reach/Instruct patient to call for assistance before getting out of bed or chair/Non-slip footwear when patient is out of bed/[place] to call system/Physically safe environment - no spills, clutter or unnecessary equipment/Purposeful Proactive Rounding/Room/bathroom lighting operational, light cord in reach

## 2024-09-10 ENCOUNTER — EMERGENCY (EMERGENCY)
Facility: HOSPITAL | Age: 49
LOS: 0 days | Discharge: HOME | End: 2024-09-10
Attending: EMERGENCY MEDICINE
Payer: COMMERCIAL

## 2024-09-10 VITALS
RESPIRATION RATE: 18 BRPM | DIASTOLIC BLOOD PRESSURE: 80 MMHG | OXYGEN SATURATION: 98 % | SYSTOLIC BLOOD PRESSURE: 152 MMHG | HEART RATE: 79 BPM

## 2024-09-10 VITALS
HEIGHT: 72 IN | RESPIRATION RATE: 18 BRPM | OXYGEN SATURATION: 99 % | DIASTOLIC BLOOD PRESSURE: 95 MMHG | TEMPERATURE: 98 F | WEIGHT: 220.02 LBS | HEART RATE: 110 BPM | SYSTOLIC BLOOD PRESSURE: 177 MMHG

## 2024-09-10 DIAGNOSIS — R07.9 CHEST PAIN, UNSPECIFIED: ICD-10-CM

## 2024-09-10 DIAGNOSIS — R07.89 OTHER CHEST PAIN: ICD-10-CM

## 2024-09-10 DIAGNOSIS — R00.0 TACHYCARDIA, UNSPECIFIED: ICD-10-CM

## 2024-09-10 DIAGNOSIS — F32.A DEPRESSION, UNSPECIFIED: ICD-10-CM

## 2024-09-10 DIAGNOSIS — Z90.89 ACQUIRED ABSENCE OF OTHER ORGANS: Chronic | ICD-10-CM

## 2024-09-10 DIAGNOSIS — I10 ESSENTIAL (PRIMARY) HYPERTENSION: ICD-10-CM

## 2024-09-10 DIAGNOSIS — I48.91 UNSPECIFIED ATRIAL FIBRILLATION: ICD-10-CM

## 2024-09-10 DIAGNOSIS — E78.5 HYPERLIPIDEMIA, UNSPECIFIED: ICD-10-CM

## 2024-09-10 LAB
ALBUMIN SERPL ELPH-MCNC: 4.6 G/DL — SIGNIFICANT CHANGE UP (ref 3.5–5.2)
ALP SERPL-CCNC: 57 U/L — SIGNIFICANT CHANGE UP (ref 30–115)
ALT FLD-CCNC: 26 U/L — SIGNIFICANT CHANGE UP (ref 0–41)
ANION GAP SERPL CALC-SCNC: 9 MMOL/L — SIGNIFICANT CHANGE UP (ref 7–14)
AST SERPL-CCNC: 30 U/L — SIGNIFICANT CHANGE UP (ref 0–41)
BASOPHILS # BLD AUTO: 0.06 K/UL — SIGNIFICANT CHANGE UP (ref 0–0.2)
BASOPHILS NFR BLD AUTO: 1 % — SIGNIFICANT CHANGE UP (ref 0–1)
BILIRUB SERPL-MCNC: 0.6 MG/DL — SIGNIFICANT CHANGE UP (ref 0.2–1.2)
BUN SERPL-MCNC: 18 MG/DL — SIGNIFICANT CHANGE UP (ref 10–20)
CALCIUM SERPL-MCNC: 9.8 MG/DL — SIGNIFICANT CHANGE UP (ref 8.4–10.5)
CHLORIDE SERPL-SCNC: 101 MMOL/L — SIGNIFICANT CHANGE UP (ref 98–110)
CO2 SERPL-SCNC: 26 MMOL/L — SIGNIFICANT CHANGE UP (ref 17–32)
CREAT SERPL-MCNC: 1.4 MG/DL — SIGNIFICANT CHANGE UP (ref 0.7–1.5)
EGFR: 61 ML/MIN/1.73M2 — SIGNIFICANT CHANGE UP
EOSINOPHIL # BLD AUTO: 0.17 K/UL — SIGNIFICANT CHANGE UP (ref 0–0.7)
EOSINOPHIL NFR BLD AUTO: 2.8 % — SIGNIFICANT CHANGE UP (ref 0–8)
GLUCOSE SERPL-MCNC: 96 MG/DL — SIGNIFICANT CHANGE UP (ref 70–99)
HCT VFR BLD CALC: 45.2 % — SIGNIFICANT CHANGE UP (ref 42–52)
HGB BLD-MCNC: 16.2 G/DL — SIGNIFICANT CHANGE UP (ref 14–18)
IMM GRANULOCYTES NFR BLD AUTO: 0.7 % — HIGH (ref 0.1–0.3)
LYMPHOCYTES # BLD AUTO: 1.84 K/UL — SIGNIFICANT CHANGE UP (ref 1.2–3.4)
LYMPHOCYTES # BLD AUTO: 30.5 % — SIGNIFICANT CHANGE UP (ref 20.5–51.1)
MCHC RBC-ENTMCNC: 28.9 PG — SIGNIFICANT CHANGE UP (ref 27–31)
MCHC RBC-ENTMCNC: 35.8 G/DL — SIGNIFICANT CHANGE UP (ref 32–37)
MCV RBC AUTO: 80.7 FL — SIGNIFICANT CHANGE UP (ref 80–94)
MONOCYTES # BLD AUTO: 0.53 K/UL — SIGNIFICANT CHANGE UP (ref 0.1–0.6)
MONOCYTES NFR BLD AUTO: 8.8 % — SIGNIFICANT CHANGE UP (ref 1.7–9.3)
NEUTROPHILS # BLD AUTO: 3.4 K/UL — SIGNIFICANT CHANGE UP (ref 1.4–6.5)
NEUTROPHILS NFR BLD AUTO: 56.2 % — SIGNIFICANT CHANGE UP (ref 42.2–75.2)
NRBC # BLD: 0 /100 WBCS — SIGNIFICANT CHANGE UP (ref 0–0)
PLATELET # BLD AUTO: 195 K/UL — SIGNIFICANT CHANGE UP (ref 130–400)
PMV BLD: 10.2 FL — SIGNIFICANT CHANGE UP (ref 7.4–10.4)
POTASSIUM SERPL-MCNC: 4.9 MMOL/L — SIGNIFICANT CHANGE UP (ref 3.5–5)
POTASSIUM SERPL-SCNC: 4.9 MMOL/L — SIGNIFICANT CHANGE UP (ref 3.5–5)
PROT SERPL-MCNC: 7.1 G/DL — SIGNIFICANT CHANGE UP (ref 6–8)
RBC # BLD: 5.6 M/UL — SIGNIFICANT CHANGE UP (ref 4.7–6.1)
RBC # FLD: 12.2 % — SIGNIFICANT CHANGE UP (ref 11.5–14.5)
SODIUM SERPL-SCNC: 136 MMOL/L — SIGNIFICANT CHANGE UP (ref 135–146)
TROPONIN T, HIGH SENSITIVITY RESULT: 11 NG/L — SIGNIFICANT CHANGE UP (ref 6–21)
TROPONIN T, HIGH SENSITIVITY RESULT: 11 NG/L — SIGNIFICANT CHANGE UP (ref 6–21)
WBC # BLD: 6.04 K/UL — SIGNIFICANT CHANGE UP (ref 4.8–10.8)
WBC # FLD AUTO: 6.04 K/UL — SIGNIFICANT CHANGE UP (ref 4.8–10.8)

## 2024-09-10 PROCEDURE — 93010 ELECTROCARDIOGRAM REPORT: CPT

## 2024-09-10 PROCEDURE — 80053 COMPREHEN METABOLIC PANEL: CPT

## 2024-09-10 PROCEDURE — 85025 COMPLETE CBC W/AUTO DIFF WBC: CPT

## 2024-09-10 PROCEDURE — 93005 ELECTROCARDIOGRAM TRACING: CPT

## 2024-09-10 PROCEDURE — 36000 PLACE NEEDLE IN VEIN: CPT

## 2024-09-10 PROCEDURE — 99285 EMERGENCY DEPT VISIT HI MDM: CPT | Mod: 25

## 2024-09-10 PROCEDURE — 71046 X-RAY EXAM CHEST 2 VIEWS: CPT | Mod: 26

## 2024-09-10 PROCEDURE — 99285 EMERGENCY DEPT VISIT HI MDM: CPT

## 2024-09-10 PROCEDURE — 84484 ASSAY OF TROPONIN QUANT: CPT

## 2024-09-10 PROCEDURE — 71046 X-RAY EXAM CHEST 2 VIEWS: CPT

## 2024-09-10 PROCEDURE — 36415 COLL VENOUS BLD VENIPUNCTURE: CPT

## 2024-09-10 NOTE — ED ADULT NURSE NOTE - HISTORY OF COVID-19 VACCINATION
Attempted to do a PA through cover my meds for Nexium 40 mg and it stated PA not required.  
Vaccine status unknown

## 2024-09-10 NOTE — ED PROVIDER NOTE - CLINICAL SUMMARY MEDICAL DECISION MAKING FREE TEXT BOX
48-year-old male above past medical history presents after second episode of chest pain, patient able to complete his workout but felt fleeting left chest pain, had nonexertional episodes last week, seen his cardiologist tomorrow but wanted to be checked, currently asymptomatic, no PE risk factors, physical exam unremarkable, labs and studies appreciated, will discharge to see cardiology tomorrow, counseled regarding conditions which should prompt return.

## 2024-09-10 NOTE — ED PROVIDER NOTE - NSFOLLOWUPINSTRUCTIONS_ED_ALL_ED_FT
Follow up with your cardiologist.     Chest Pain    Chest pain can be caused by many different conditions which may or may not be dangerous. Causes include heartburn, lung infections, heart attack, blood clot in lungs, skin infections, strain or damage to muscle, cartilage, or bones, etc. In addition to a history and physical examination, an electrocardiogram (ECG) or other lab tests may have been performed to determine the cause of your chest pain. Follow up with your primary care provider or with a cardiologist as instructed.     SEEK IMMEDIATE MEDICAL CARE IF YOU HAVE ANY OF THE FOLLOWING SYMPTOMS: worsening chest pain, coughing up blood, unexplained back/neck/jaw pain, severe abdominal pain, dizziness or lightheadedness, fainting, shortness of breath, sweaty or clammy skin, vomiting, or racing heart beat. These symptoms may represent a serious problem that is an emergency. Do not wait to see if the symptoms will go away. Get medical help right away. Call 911 and do not drive yourself to the hospital.

## 2024-09-10 NOTE — ED PROVIDER NOTE - OBJECTIVE STATEMENT
48-year-old male with past medical history of A-fib that has reportedly resolved, hypertension, HLD, depression, presents to the ED complaining of chest pain.  Patient notes that 4 days ago patient felt substernal chest pain on exertion that resolved within 30 minutes.  Today, patient was exercising at the gym when he felt a shooting pain across his left chest that lasted for 2 seconds.  Patient follows with cardiology, Dr. Penn, and was scheduled to have a stress test and wear a Holter monitor approximately 6 months ago, however never followed up.  Patient has an upcoming appointment with Dr. Penn tomorrow.  While in the ED, patient is comfortable and states his symptoms have resolved.  Patient has not had any recent fever, headache, dizziness, difficulty breathing, vomiting, or diarrhea.

## 2024-09-10 NOTE — ED PROVIDER NOTE - PHYSICAL EXAMINATION
PHYSICAL EXAM: I have reviewed current vital signs.  GENERAL: NAD, well-nourished; well-developed.  HEAD:  Normocephalic, atraumatic.  EYES: Conjunctiva and sclera clear.  ENT: MMM, no erythema/exudates.  CHEST/LUNG: Clear to auscultation bilaterally; no wheezes, rales, or rhonchi. No reproducible tenderness.  HEART: Regular rate and rhythm, normal S1 and S2; no murmurs, rubs, or gallops.  ABDOMEN: Soft, nontender, nondistended.  EXTREMITIES:  2+ peripheral pulses; no clubbing, cyanosis, or edema.  PSYCH: Cooperative, appropriate, normal mood and affect.  NEUROLOGY: A&O x 3. No focal neurological deficits.   SKIN: Warm and dry.

## 2024-09-10 NOTE — ED PROVIDER NOTE - PATIENT PORTAL LINK FT
You can access the FollowMyHealth Patient Portal offered by [facility] by registering at the following website: [email or web address]. By joining [facility]’s FollowMyHealth portal, you will also be able to view your health information using other applications (apps) compatible with our system.

## 2024-09-10 NOTE — ED PROVIDER NOTE - PROGRESS NOTE DETAILS
AE: Patient is well-appearing.  Results discussed in detail with patient who will follow-up with his cardiologist.  Will discharge with strict return precautions.

## 2024-09-10 NOTE — ED PROVIDER NOTE - CARE PROVIDER_API CALL
Min Penn  Interventional Cardiology  [address] [place] [address], [address]  [place] [address]  Phone: [phone]  Fax: [phone]  Follow Up Time:

## 2025-07-25 ENCOUNTER — NON-APPOINTMENT (OUTPATIENT)
Age: 50
End: 2025-07-25